# Patient Record
Sex: FEMALE
[De-identification: names, ages, dates, MRNs, and addresses within clinical notes are randomized per-mention and may not be internally consistent; named-entity substitution may affect disease eponyms.]

---

## 2020-07-20 ENCOUNTER — APPOINTMENT (OUTPATIENT)
Dept: SPEECH THERAPY | Facility: CLINIC | Age: 75
End: 2020-07-20
Payer: MEDICARE

## 2020-07-20 PROCEDURE — 92520 LARYNGEAL FUNCTION STUDIES: CPT | Mod: GN

## 2020-07-20 PROCEDURE — 92524 BEHAVRAL QUALIT ANALYS VOICE: CPT | Mod: GN

## 2020-07-21 NOTE — ASSESSMENT
[FreeTextEntry1] : VOICE EVALUATION REPORT\par \par Date of Report:  2020\par Date of Evaluation:   2020\par Patient Name:  Loly Thorpe\par : 1945	C.A.:  75\par Medical Diagnosis: Dysphonia (R49.0); Vocal Cord Nodule (J38.2)\par Treatment Diagnosis: Dysphonia (R49.0)\par Procedure Codes: Voice Evaluation – 70811; 34132\par Referring Physician: Dr. Price Pak, ENT\par Primary Voice Complaint: Hoarseness\par Date of Onset: Ongoing\par \par REASON FOR REFERRAL: Loly Thorpe is a 75 year-old female who presented to the [facility] for a formal voice evaluation upon the referral of her otolaryngologist, Dr. Yousif Pak, due to a diagnosis of dysphonia and vocal cord nodule.\par \par PERTINENT BACKGROUND INFORMATION: The patient presented to today’s evaluation with complaints of a “raspy, hoarse sounding voice” which began in early 2020. The patient reported that her vocal difficulties became so severe approximately 3 weeks ago, that she needed to rely on pen/paper to communicate with family members. The patient initially saw her internist on 20, at which time her symptoms were attributed to allergies, however allergy medications have not been helpful. She then saw her ENT (Dr. Pak) on 20, with laryngoscopy revealing a vocal cord nodule. 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 She has noticed some improvement in her voice over the past week or so being that she did not need to babysit her grandchildren. The patient further stated that when she is feeling stressed, she may experience tightness in her neck. She denied difficulty swallowing, odynophagia and unintentional weight loss. The patient also denied history of smoking and reported no caffeine intake. She attempts to drink a minimum of 6 cups of water per day.\par \par MEDICAL HISTORY: Per patient report and limited medical records, additional medical history was reported to be significant for Hoshimito's thyroiditis. Current medication intake includes Levoxyl, Montelukast, Azelastine, PreserVision and Vitamin D.\par \par Clinical Findings – Voice\par Perceptual Voice Analysis:	\par Vocal Quality:  Hoarse, Strained; Breathy\par Severity:  Moderate to Severe\par Loudness Level:  Low\par Pitch: Low\par Musculoskeletal Tension: Present\par Location:  Neck, Shoulders\par Respiration:  Upper Thoracic\par Resonance:  WFLs\par Tone Focus:  Back\par Type of Onset:  Hard glottal attack\par Vocal Reyes:  Absent\par Laryngeal Palpation:  WFL\par \par Acoustic Analysis\par The VisiPitch IV 3950 was utilized to obtain baseline objective vocal function data.  The following information was obtained:\par \par Sustained Phonation:\par Fundamental Frequency =   209.09 Hz (Normal 143-235 Hz) \par Frequency Range =  85.91 Hz  (Normal 25.09 Hz)\par Habitual Intensity =  70.95 dB (Normal  dB)\par Frequency Perturbation = 1.4 % (Normal <1%)\par Amplitude Perturbation = .18 dB (Normal <.33 dB)\par Percentage Voiced = 99%   (Normal 100%)\par Maximum Phonation Time:  13.29 seconds (Normal 13.55 seconds)\par Minimum Phonatory Range: 197.60 Hz (134 Hz)\par Maximum Phonatory Range: 397.10 Hz (571 Hz)\par \par Conversational Speech:\par Fundamental Frequency =  193.52 Hz (Normal 202 Hz)\par Intensity =  74.40 dB  (Normal  dB)\par \par INTERPRETATION: Perceptually, Loly presented with a moderate to severe dysphonia characterized by a hoarse, strained and somewhat breathy vocal quality with low vocal volume, fluctuating pitch, back tone focus and intermittent use of a hard glottal attack. Additionally, the patient exhibited an upper thoracic breathing pattern and musculoskeletal tension in the neck and shoulders. An intermittent dry cough was also noted throughout speaking tasks. Acoustic analysis for sustained phonation revealed appropriate fundamental frequency, increased frequency range, decreased habitual intensity, increased frequency perturbation, appropriate amplitude perturbation and decreased phonatory range. Maximum phonation time was judged to be appropriate for the patient’s age/gender. When presented with a diagnostic reading passage, Loly was noted to demonstrated reduced fundamental frequency and decreased vocal intensity.\par \par The Voice Handicap Index revealed a self-rating score of 90/120, suggesting a severe impact upon her lifestyle.\par \par Recommendations:\par 1)	Voice Therapy (CPT – 14984) is recommended 1x per week for 6-8 weeks to address the above areas of concern and to assess if improvement in overall phonatory functioning can be achieved\par 2)	Follow up with physician as directed\par \par PROGNOSIS: Good for functional communication\par \par EDUCATION:  Verbal and written educational information and instruction were provided to the patient, at which time good understanding was demonstrated. \par \par Should you have any additional concerns, please contact the Center at [phone].\par \par Roberta Tyson M.S., CCC-SLP\par Speech-Language Pathologist\par [facility]\par

## 2020-08-03 ENCOUNTER — APPOINTMENT (OUTPATIENT)
Dept: SPEECH THERAPY | Facility: CLINIC | Age: 75
End: 2020-08-03
Payer: MEDICARE

## 2020-08-03 PROCEDURE — 92507 TX SP LANG VOICE COMM INDIV: CPT | Mod: GN

## 2020-08-10 ENCOUNTER — APPOINTMENT (OUTPATIENT)
Dept: SPEECH THERAPY | Facility: CLINIC | Age: 75
End: 2020-08-10
Payer: MEDICARE

## 2020-08-10 PROCEDURE — 92507 TX SP LANG VOICE COMM INDIV: CPT | Mod: GN

## 2020-08-17 ENCOUNTER — APPOINTMENT (OUTPATIENT)
Dept: SPEECH THERAPY | Facility: CLINIC | Age: 75
End: 2020-08-17
Payer: MEDICARE

## 2020-08-17 PROCEDURE — 92507 TX SP LANG VOICE COMM INDIV: CPT | Mod: GN

## 2020-08-31 ENCOUNTER — APPOINTMENT (OUTPATIENT)
Dept: SPEECH THERAPY | Facility: CLINIC | Age: 75
End: 2020-08-31
Payer: MEDICARE

## 2020-08-31 PROCEDURE — 92507 TX SP LANG VOICE COMM INDIV: CPT | Mod: GN

## 2020-10-13 ENCOUNTER — APPOINTMENT (OUTPATIENT)
Dept: SPEECH THERAPY | Facility: CLINIC | Age: 75
End: 2020-10-13